# Patient Record
(demographics unavailable — no encounter records)

---

## 2024-12-09 NOTE — HISTORY OF PRESENT ILLNESS
[FreeTextEntry1] : Patient with a history of colon polyps due for follow-up surveillance colonoscopy.  Still taking pantoprazole almost daily for history of GERD.  Had cardiac workup for nocturnal chest pain and palpitations which was negative.  Now on Zoloft.  Unclear if it was nocturnal reflux triggering attacks or was purely anxiety related.  Seems almost exclusively to occur after drinking alcohol.  Very rarely does this occur any longer.  EGD 2020 was normal

## 2024-12-09 NOTE — REVIEW OF SYSTEMS
[As Noted in HPI] : as noted in HPI [Negative] : Heme/Lymph [FreeTextEntry6] : History of sleep apnea uses CPAP

## 2024-12-09 NOTE — ASSESSMENT
[FreeTextEntry1] : Impression: History of colon polyps due for follow-up surveillance colonoscopy.  Given ongoing reflux symptoms and maintenance PPI we will do EGD as well.  If EGD is normal can try to wean from PPI as chest pain/palpitations may just as well be under control with Zoloft rather than acid suppression therapy.